# Patient Record
Sex: MALE | Race: WHITE | NOT HISPANIC OR LATINO | Employment: STUDENT | URBAN - METROPOLITAN AREA
[De-identification: names, ages, dates, MRNs, and addresses within clinical notes are randomized per-mention and may not be internally consistent; named-entity substitution may affect disease eponyms.]

---
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
## 2020-01-16 ENCOUNTER — OFFICE VISIT (OUTPATIENT)
Dept: URGENT CARE | Facility: RETAIL CLINIC | Age: 20
End: 2020-01-16
Payer: COMMERCIAL

## 2020-01-16 VITALS
OXYGEN SATURATION: 100 % | SYSTOLIC BLOOD PRESSURE: 117 MMHG | DIASTOLIC BLOOD PRESSURE: 72 MMHG | TEMPERATURE: 98.7 F | HEART RATE: 88 BPM

## 2020-01-16 DIAGNOSIS — J02.9 ACUTE PHARYNGITIS, UNSPECIFIED ETIOLOGY: Primary | ICD-10-CM

## 2020-01-16 LAB — S PYO AG THROAT QL IA.RAPID: ABNORMAL

## 2020-01-16 PROCEDURE — 99213 OFFICE O/P EST LOW 20 MIN: CPT | Performed by: INTERNAL MEDICINE

## 2020-01-16 PROCEDURE — 87880 STREP A ASSAY W/OPTIC: CPT | Mod: QW | Performed by: INTERNAL MEDICINE

## 2020-01-16 RX ORDER — PENICILLIN V POTASSIUM 500 MG/1
500 TABLET, FILM COATED ORAL 3 TIMES DAILY
Qty: 30 TABLET | Refills: 0 | Status: SHIPPED | OUTPATIENT
Start: 2020-01-16 | End: 2020-01-26

## 2020-01-16 NOTE — PROGRESS NOTES
[facility] [place]        Landon Tapia MD, MPH  01/16/2020        History:      Kelvin Sellers is a 19 year old male with a chief complaint of sore throat.  Onset of symptoms was 2 day(s) ago.    No dyspnea or wheezing or cough  No vomiting    No diarrhea  No abdominal pain  Eating and drinking well  Making urine well         Assessment and Plan:        - BETA STREP GROUP A R/O CULTURE  - RAPID STREP SCREEN: Positive.  Acute strep pharyngitis:  - penicillin V (VEETID) 500 MG tablet; Take 1 tablet (500 mg) by mouth 3 times daily for 10 days  Dispense: 30 tablet; Refill: 0  Advised patient to increase/encourage fluid intake and rest.  Advised to discard current tooth brush.  Advised to stay home and rest today and tomorrow.  Tylenol  Every 6 hours as needed alternating with ibuprofen every 6 hours as needed for pain and fever.  F/u w PCP in 4-5 days, earlier if symptoms worsen.                   Physical Exam:      /72   Pulse 88   Temp 98.7  F (37.1  C) (Oral)   SpO2 100%      Constitutional: Patient is in no distress The patient is pleasant and cooperative.   HEENT: Head:  Head is atraumatic, normocephalic.    Eyes: Pupils are equal, round and reactive to light and accomodation.  Sclera is non-icteric. No conjunctival injection, or exudate noted. Extraocular motion is intact. Visual acuity is intact bilaterally.  Ears:  External acoustic canals are patent and clear.  There is no erythema and bulging( exudate)  of the ( R/L ) tympanic membrane(s ).   Nose:  No Nasal congestion, drainage or mucosal ulceration is noted.  Throat:  Oral mucosa is moist.  No oral lesions are noted.  Posterior pharyngeal hyperemia w exudate noted.     Neck Supple.  There is cervical lymphadenopathy.  No nuchal rigidity noted.  There is no meningismus.     Cardiovascular:  Chest Wall: Heart is regular to rate and rhythm.  No murmur is noted.       Lungs: Clear in the anterior and posterior pulmonary fields.    Abdomen: Soft and non-tender.    Back No flank tenderness is noted.   Extremeties No edema, no calf tenderness.   Neuro: No focal deficit.   Skin No petechiae or purpura is noted.  There is no rash.   Mood Normal              Data:      All new lab and imaging data was reviewed.   No results found for any visits on 01/16/20.

## 2021-06-14 ENCOUNTER — HOSPITAL ENCOUNTER (EMERGENCY)
Facility: CLINIC | Age: 21
Discharge: HOME | End: 2021-06-14
Attending: PHYSICIAN ASSISTANT | Admitting: PHYSICIAN ASSISTANT
Payer: COMMERCIAL

## 2021-06-14 VITALS
RESPIRATION RATE: 18 BRPM | TEMPERATURE: 98.1 F | SYSTOLIC BLOOD PRESSURE: 129 MMHG | HEART RATE: 83 BPM | DIASTOLIC BLOOD PRESSURE: 93 MMHG | BODY MASS INDEX: 23 KG/M2 | OXYGEN SATURATION: 99 % | WEIGHT: 150 LBS

## 2021-06-14 DIAGNOSIS — S81.012A KNEE LACERATION, LEFT, INITIAL ENCOUNTER: ICD-10-CM

## 2021-06-14 PROCEDURE — 99283 EMERGENCY DEPT VISIT LOW MDM: CPT | Mod: 25 | Performed by: PHYSICIAN ASSISTANT

## 2021-06-14 PROCEDURE — 250N000009 HC RX 250: Performed by: PHYSICIAN ASSISTANT

## 2021-06-14 PROCEDURE — 12032 INTMD RPR S/A/T/EXT 2.6-7.5: CPT | Performed by: PHYSICIAN ASSISTANT

## 2021-06-14 RX ORDER — BUPIVACAINE HYDROCHLORIDE 5 MG/ML
10 INJECTION, SOLUTION EPIDURAL; INTRACAUDAL ONCE
Status: COMPLETED | OUTPATIENT
Start: 2021-06-14 | End: 2021-06-14

## 2021-06-14 RX ADMIN — BUPIVACAINE HYDROCHLORIDE 50 MG: 5 INJECTION, SOLUTION EPIDURAL; INTRACAUDAL at 18:55

## 2021-06-15 NOTE — ED PROVIDER NOTES
History     Chief Complaint   Patient presents with     Laceration     HPI  Kelvin Sellers is a 21 year old male who presents for evaluation of a laceration to the left anterior knee that occurred just prior to arrival when he was trimming a bush with a .  He slipped and the tremor hit his anterior knee area despite wearing jeans.  Denies any alteration to his range of motion, strength, or sensation.       Allergies:  No Known Allergies    Problem List:    There are no active problems to display for this patient.       Past Medical History:    Past Medical History:   Diagnosis Date     NO ACTIVE PROBLEMS        Past Surgical History:    Past Surgical History:   Procedure Laterality Date     ZZC NONSPECIFIC PROCEDURE  age 1    spitz nevus removed from left great toe       Family History:    Family History   Problem Relation Age of Onset     Lipids Father      Diabetes Maternal Grandmother      Heart Disease Maternal Grandmother      Cancer - colorectal Maternal Grandfather      Prostate Cancer Maternal Grandfather      Heart Disease Paternal Grandmother      Lipids Paternal Grandmother        Social History:  Marital Status:  Single [1]  Social History     Tobacco Use     Smoking status: Former Smoker     Smokeless tobacco: Never Used   Substance Use Topics     Alcohol use: No     Drug use: No        Medications:    acetaminophen (TYLENOL) 325 MG tablet  ibuprofen (ADVIL,MOTRIN) 200 MG tablet          Review of Systems   All other systems reviewed and are negative.      Physical Exam   BP: (!) 129/93  Pulse: 83  Temp: 98.1  F (36.7  C)  Resp: 18  Weight: 68 kg (150 lb)  SpO2: 99 %      Physical Exam  Vitals signs and nursing note reviewed.   Constitutional:       General: He is not in acute distress.     Appearance: Normal appearance. He is not ill-appearing.   Musculoskeletal:        Legs:       Comments: Normal range of motion the knee.  No sign of tendon or nerve involvement.  No other injuries  identified.   Neurological:      Mental Status: He is alert.         ED Course        [facility]    -Laceration Repair    Date/Time: 6/15/2021 12:10 AM  Performed by: Garland Malhotra PA-C  Authorized by: Garland Malhotra PA-C       ANESTHESIA (see MAR for exact dosages):     Anesthesia method:  Local infiltration    Local anesthetic:  Bupivacaine 0.5% w/o epi  LACERATION DETAILS     Location:  Leg    Leg location:  L knee    Length (cm):  4.5    REPAIR TYPE:     Repair type:  Simple      EXPLORATION:     Wound exploration: wound explored through full range of motion and entire depth of wound probed and visualized      TREATMENT:     Area cleansed with:  Saline    Amount of cleaning:  Extensive    SKIN REPAIR     Repair method:  Sutures    Suture size:  4-0    Suture material:  Nylon    Suture technique:  Simple interrupted    Number of sutures:  10    APPROXIMATION     Approximation:  Close    POST-PROCEDURE DETAILS     Dressing:  Antibiotic ointment and sterile dressing      PROCEDURE   Patient Tolerance:  Patient tolerated the procedure well with no immediate complications                     Critical Care time:  none               No results found for this or any previous visit (from the past 24 hour(s)).    Medications   bupivacaine (MARCAINE) 0.5% preservative free injection (50 mg Intradermal Given 6/14/21 5865)       Assessments & Plan (with Medical Decision Making)  Knee laceration, left, initial encounter     21 year old male presents for evaluation of a laceration to the left anterior knee without tendon or nerve involvement that occurred just prior to arrival when he was using a .  Medial based flap laceration with a another flap medial to this noted.  Total length of the laceration is 4.5 cm.  0.5% bupivacaine was used to anesthetize the wound with good success.  Aggressively washed out with saline by myself.  #Ten 4-0 Ethilon interrupted sutures  used to approximate the wound edges.  Bacitracin ointment and a bandage applied.  Wrapped with an Ace bandage to help prevent knee range of motion over the next 4-5 days.  Wound care measures discussed.  Sutures can be considered for removal in 9-10 days.  His mother has worked in the ER in the past, and knows how to remove sutures.  Return instructions reviewed.  Patient was in agreement and was suitable for discharge.     I have reviewed the nursing notes.    I have reviewed the findings, diagnosis, plan and need for follow up with the patient.       Discharge Medication List as of 6/14/2021  7:03 PM          Final diagnoses:   Knee laceration, left, initial encounter       Disclaimer: This note consists of symbols derived from keyboarding, dictation and/or voice recognition software. As a result, there may be errors in the script that have gone undetected. Please consider this when interpreting information found in this chart.      6/14/2021   [facility] EMERGENCY DEPT     Garland Malhotra PA-C  06/15/21 0013

## 2021-06-15 NOTE — DISCHARGE INSTRUCTIONS
It was a pleasure working with you today!  I hope your condition improves rapidly!     Please keep your wound clean and dry.  Do not get wet for the first 4 days.  You can wrap your knee with Saran wrap to prevent moisture getting to the wound when you shower.  Please also try and keep your knee as straight as possible in the first 4 days to allow the initial patch work to occur.  Change your dressing 1-2 times per day and use bacitracin ointment underneath the dressing for the first 4 days.  After that, you can just use a regular large Band-Aid.  Sutures can be considered for removal in 9 days.

## 2021-06-19 ENCOUNTER — TELEPHONE (OUTPATIENT)
Dept: EMERGENCY MEDICINE | Facility: CLINIC | Age: 21
End: 2021-06-19
Payer: COMMERCIAL

## 2024-07-13 ENCOUNTER — HOSPITAL ENCOUNTER (EMERGENCY)
Facility: CLINIC | Age: 24
Discharge: HOME | End: 2024-07-13
Attending: FAMILY MEDICINE | Admitting: FAMILY MEDICINE
Payer: COMMERCIAL

## 2024-07-13 ENCOUNTER — APPOINTMENT (OUTPATIENT)
Dept: GENERAL RADIOLOGY | Facility: CLINIC | Age: 24
End: 2024-07-13
Attending: FAMILY MEDICINE
Payer: COMMERCIAL

## 2024-07-13 VITALS
HEART RATE: 65 BPM | BODY MASS INDEX: 23.12 KG/M2 | DIASTOLIC BLOOD PRESSURE: 93 MMHG | WEIGHT: 150.8 LBS | SYSTOLIC BLOOD PRESSURE: 141 MMHG | RESPIRATION RATE: 18 BRPM | TEMPERATURE: 98.9 F | OXYGEN SATURATION: 99 %

## 2024-07-13 DIAGNOSIS — R26.89 IMPAIRED WEIGHT BEARING: ICD-10-CM

## 2024-07-13 DIAGNOSIS — S32.502A CLOSED FRACTURE OF LEFT PUBIS, UNSPECIFIED PORTION OF PUBIS, INITIAL ENCOUNTER (H): ICD-10-CM

## 2024-07-13 DIAGNOSIS — T14.8XXA ABRASION: ICD-10-CM
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
## 2024-07-13 PROCEDURE — 99284 EMERGENCY DEPT VISIT MOD MDM: CPT | Performed by: FAMILY MEDICINE

## 2024-07-13 PROCEDURE — 90471 IMMUNIZATION ADMIN: CPT | Performed by: FAMILY MEDICINE

## 2024-07-13 PROCEDURE — 73502 X-RAY EXAM HIP UNI 2-3 VIEWS: CPT

## 2024-07-13 PROCEDURE — 250N000011 HC RX IP 250 OP 636: Performed by: FAMILY MEDICINE

## 2024-07-13 PROCEDURE — 90715 TDAP VACCINE 7 YRS/> IM: CPT | Performed by: FAMILY MEDICINE

## 2024-07-13 PROCEDURE — 99284 EMERGENCY DEPT VISIT MOD MDM: CPT | Mod: 25 | Performed by: FAMILY MEDICINE

## 2024-07-13 RX ADMIN — CLOSTRIDIUM TETANI TOXOID ANTIGEN (FORMALDEHYDE INACTIVATED), CORYNEBACTERIUM DIPHTHERIAE TOXOID ANTIGEN (FORMALDEHYDE INACTIVATED), BORDETELLA PERTUSSIS TOXOID ANTIGEN (GLUTARALDEHYDE INACTIVATED), BORDETELLA PERTUSSIS FILAMENTOUS HEMAGGLUTININ ANTIGEN (FORMALDEHYDE INACTIVATED), BORDETELLA PERTUSSIS PERTACTIN ANTIGEN, AND BORDETELLA PERTUSSIS FIMBRIAE 2/3 ANTIGEN 0.5 ML: 5; 2; 2.5; 5; 3; 5 INJECTION, SUSPENSION INTRAMUSCULAR at 20:05

## 2024-07-13 ASSESSMENT — COLUMBIA-SUICIDE SEVERITY RATING SCALE - C-SSRS
6. HAVE YOU EVER DONE ANYTHING, STARTED TO DO ANYTHING, OR PREPARED TO DO ANYTHING TO END YOUR LIFE?: NO
2. HAVE YOU ACTUALLY HAD ANY THOUGHTS OF KILLING YOURSELF IN THE PAST MONTH?: NO
1. IN THE PAST MONTH, HAVE YOU WISHED YOU WERE DEAD OR WISHED YOU COULD GO TO SLEEP AND NOT WAKE UP?: NO

## 2024-07-13 ASSESSMENT — ACTIVITIES OF DAILY LIVING (ADL)
ADLS_ACUITY_SCORE: 35
ADLS_ACUITY_SCORE: 35
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
## 2024-07-14 NOTE — DISCHARGE INSTRUCTIONS
Ibuprofen 600 mg and Tylenol 1000 mg every 6 hours as needed for pain.  You can take them at the same time.  Take with food so the Ibuprofen doesn't upset your stomach.  Use the crutches to get around.  You can weight-bear as tolerated.  Take it easy this weekend and see Dr. Manning in clinic on Monday.  See hi to your mom for me!      Thank you for choosing [facility]. We appreciate the opportunity to meet your urgent medical needs. Please let us know if we could have done anything to make your stay more satisfying.    After discharge, please closely monitor for any new or worsening symptoms. Return to the Emergency Department if you develop any acute worsening signs or symptoms.    If you had lab work, cultures or imaging studies done during your stay, the final results may still be pending. We will call you if your plan of care needs to change. However, if you are not improving as expected, please follow up with your primary care provider or clinic.     Start any prescription medications that were prescribed to you and take them as directed.     Please see additional handouts that may be pertinent to your condition.
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
## 2024-07-15 ENCOUNTER — OFFICE VISIT (OUTPATIENT)
Dept: ORTHOPEDICS | Facility: CLINIC | Age: 24
End: 2024-07-15
Payer: COMMERCIAL

## 2024-07-15 ENCOUNTER — TELEPHONE (OUTPATIENT)
Dept: ORTHOPEDICS | Facility: CLINIC | Age: 24
End: 2024-07-15

## 2024-07-15 VITALS
BODY MASS INDEX: 22.73 KG/M2 | DIASTOLIC BLOOD PRESSURE: 78 MMHG | SYSTOLIC BLOOD PRESSURE: 116 MMHG | WEIGHT: 150 LBS | HEIGHT: 68 IN

## 2024-07-15 DIAGNOSIS — S32.502A CLOSED FRACTURE OF LEFT PUBIS, UNSPECIFIED PORTION OF PUBIS, INITIAL ENCOUNTER (H): ICD-10-CM

## 2024-07-15 DIAGNOSIS — R26.89 IMPAIRED WEIGHT BEARING: ICD-10-CM

## 2024-07-15 PROCEDURE — 99203 OFFICE O/P NEW LOW 30 MIN: CPT | Performed by: ORTHOPAEDIC SURGERY

## 2024-07-15 ASSESSMENT — PAIN SCALES - GENERAL: PAINLEVEL: MODERATE PAIN (4)
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
## 2024-07-15 NOTE — LETTER
July 15, 2024      Kelvin Sellers  [address] [place] [address]        To Whom It May Concern:    Kelvin Sellers  was seen on 7-15-24.  Please excuse him from work until 8-5-24 due to injury. We will reassess at follow-up appointment.         Sincerely,        Jefferson Manning MD

## 2024-07-15 NOTE — TELEPHONE ENCOUNTER
Appointment scheduled for ER follow up for Closed fracture of left pubis, unspecified portion of pubis, for 1:40pm today.    Safia Wills RN   [facility]

## 2024-07-29 ENCOUNTER — TELEPHONE (OUTPATIENT)
Dept: ORTHOPEDICS | Facility: CLINIC | Age: 24
End: 2024-07-29
Payer: COMMERCIAL

## 2024-07-29 NOTE — TELEPHONE ENCOUNTER
Current letter states patient should be excused from work until 8/5/24.     Called and spoke with patient, he states that he does not need an updated letter at this time. He is requesting to schedule a follow-up appointment with Dr. Manning on 8/5/24. Appointment has been scheduled. Nothing further is needed at this time.     Elena Blum RN   [facility]

## 2024-07-29 NOTE — TELEPHONE ENCOUNTER
Patient Returning Call    Reason for call:  patient calling needing a return to work letter from provider. Requesting callback to pick it up     Information relayed to patient:  te sent to clinic     Patient has additional questions:  No      Okay to leave a detailed message?: Yes at Cell number on file:    Telephone Information:   Mobile [phone]

## 2024-08-05 ENCOUNTER — ANCILLARY PROCEDURE (OUTPATIENT)
Dept: GENERAL RADIOLOGY | Facility: CLINIC | Age: 24
End: 2024-08-05
Attending: ORTHOPAEDIC SURGERY
Payer: COMMERCIAL

## 2024-08-05 ENCOUNTER — OFFICE VISIT (OUTPATIENT)
Dept: ORTHOPEDICS | Facility: CLINIC | Age: 24
End: 2024-08-05
Payer: COMMERCIAL

## 2024-08-05 VITALS
HEART RATE: 82 BPM | BODY MASS INDEX: 22.73 KG/M2 | DIASTOLIC BLOOD PRESSURE: 88 MMHG | WEIGHT: 150 LBS | TEMPERATURE: 98.2 F | HEIGHT: 68 IN | SYSTOLIC BLOOD PRESSURE: 123 MMHG

## 2024-08-05 DIAGNOSIS — S32.502A CLOSED FRACTURE OF LEFT PUBIS, UNSPECIFIED PORTION OF PUBIS, INITIAL ENCOUNTER (H): Primary | ICD-10-CM

## 2024-08-05 DIAGNOSIS — S32.502A CLOSED FRACTURE OF LEFT PUBIS, UNSPECIFIED PORTION OF PUBIS, INITIAL ENCOUNTER (H): ICD-10-CM

## 2024-08-05 PROCEDURE — 99213 OFFICE O/P EST LOW 20 MIN: CPT | Performed by: ORTHOPAEDIC SURGERY

## 2024-08-05 PROCEDURE — 73502 X-RAY EXAM HIP UNI 2-3 VIEWS: CPT | Mod: TC | Performed by: RADIOLOGY

## 2024-08-05 NOTE — LETTER
August 5, 2024    To Whom It May Concern:    Kelvin Sellers was seen in our clinic today. He may return to work without restrictions.      Sincerely,              Jefferson Manning MD

## 2024-08-05 NOTE — PROGRESS NOTES
S:  Doing much better with pelvic pain after fracture.  Not using ambulatory aides.       There is no problem list on file for this patient.           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            Social History     Tobacco Use    Smoking status: Every Day     Types: Cigarettes    Smokeless tobacco: Never   Substance Use Topics    Alcohol use: No            Family History   Problem Relation Age of Onset    Lipids Father     Diabetes Maternal Grandmother     Heart Disease Maternal Grandmother     Cancer - colorectal Maternal Grandfather     Prostate Cancer Maternal Grandfather     Heart Disease Paternal Grandmother     Lipids Paternal Grandmother              No Known Allergies         Current Outpatient Medications   Medication Sig Dispense Refill    acetaminophen (TYLENOL) 325 MG tablet Take 325-650 mg by mouth every 6 hours as needed for mild pain      ibuprofen (ADVIL,MOTRIN) 200 MG tablet Take 200 mg by mouth every 8 hours as needed.            Review Of Systems  Skin: negative  Eyes: negative  Ears/Nose/Throat: negative  Respiratory: No shortness of breath, dyspnea on exertion, cough, or hemoptysis    O: Physical Exam:  supple hip rotation bilateral with IR/ER/Abduction.  CMS intact to both lower extremities.  Non tender about SI joints.    Lab: non contributory    Images:XR PELVIS AND HIP LEFT 2 VIEWS 8/5/2024 9:39 AM      HISTORY: Closed fracture of left pubis, unspecified portion of pubis,  initial encounter (H)     COMPARISON: 7/13/2024                                                                          IMPRESSION: Fracture of the left pubic body. No significant interval  change from the previous exam. Both hips negative for fracture. Pelvis  otherwise negative.         A:  Healing L pubic ramus root fx at pubic symphysis with small  fracture identified R pubic ramus root as well, stable      P:  Increase activity as tolerated  Notify if exacerbation symptoms  See back prn             In addition to the above assessment and plan each active problem on Kelvin's problem list was evaluated today. This included the questioning of Kelvin for any medication problems. We will continue the current treatment plan for these active problems except as noted.

## 2024-08-05 NOTE — LETTER
8/5/2024      Kelvin Sellers  [address]  [facility] [address]      Dear Colleague,    Thank you for referring your patient, Kelvin Sellers, to the [facility]. Please see a copy of my visit note below.    S:  Doing much better with pelvic pain after fracture.  Not using ambulatory aides.       There is no problem list on file for this patient.           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 toe            Social History     Tobacco Use     Smoking status: Every Day     Types: Cigarettes     Smokeless tobacco: Never   Substance Use Topics     Alcohol use: No            Family History   Problem Relation Age of Onset     Lipids Father      Diabetes Maternal Grandmother      Heart Disease Maternal Grandmother      Cancer - colorectal Maternal Grandfather      Prostate Cancer Maternal Grandfather      Heart Disease Paternal Grandmother      Lipids Paternal Grandmother              No Known Allergies         Current Outpatient Medications   Medication Sig Dispense Refill     acetaminophen (TYLENOL) 325 MG tablet Take 325-650 mg by mouth every 6 hours as needed for mild pain       ibuprofen (ADVIL,MOTRIN) 200 MG tablet Take 200 mg by mouth every 8 hours as needed.            Review Of Systems  Skin: negative  Eyes: negative  Ears/Nose/Throat: negative  Respiratory: No shortness of breath, dyspnea on exertion, cough, or hemoptysis    O: Physical Exam:  supple hip rotation bilateral with IR/ER/Abduction.  CMS intact to both lower extremities.  Non tender about SI joints.    Lab: non contributory    Images:XR PELVIS AND HIP LEFT 2 VIEWS 8/5/2024 9:39 AM      HISTORY: Closed fracture of left pubis, unspecified portion of pubis,  initial encounter (H)     COMPARISON: 7/13/2024                                                                           IMPRESSION: Fracture of the left pubic body. No significant interval  change from the previous exam. Both hips negative for fracture. Pelvis  otherwise negative.         A:  Healing L pubic ramus root fx at pubic symphysis with small fracture identified R pubic ramus root as well, stable      P:  Increase activity as tolerated  Notify if exacerbation symptoms  See back prn             In addition to the above assessment and plan each active problem on Kelvin's problem list was evaluated today. This included the questioning of Kelvin for any medication problems. We will continue the current treatment plan for these active problems except as noted.        Again, thank you for allowing me to participate in the care of your patient.        Sincerely,        Jefferson Manning MD